# Patient Record
Sex: MALE | Race: WHITE
[De-identification: names, ages, dates, MRNs, and addresses within clinical notes are randomized per-mention and may not be internally consistent; named-entity substitution may affect disease eponyms.]

---

## 2019-06-03 ENCOUNTER — HOSPITAL ENCOUNTER (OUTPATIENT)
Dept: HOSPITAL 92 - SCSMRI | Age: 34
Discharge: HOME | End: 2019-06-03
Attending: FAMILY MEDICINE
Payer: COMMERCIAL

## 2019-06-03 DIAGNOSIS — M27.40: ICD-10-CM

## 2019-06-03 DIAGNOSIS — R51: Primary | ICD-10-CM

## 2019-06-03 DIAGNOSIS — Z82.49: ICD-10-CM

## 2019-06-03 PROCEDURE — A9577 INJ MULTIHANCE: HCPCS

## 2019-06-03 PROCEDURE — 70553 MRI BRAIN STEM W/O & W/DYE: CPT

## 2019-06-03 NOTE — MRI
BRAIN MRI WITH AND WITHOUT CONTRAST:

 

Date:  06/03/19 

 

HISTORY:  Family medical history of brain aneurysm. Frequent headaches. 

 

COMPARISON:  None. 

 

FINDINGS:

No hemorrhage on the axial gradient echo sequence. 

 

No parenchymal mass, mass effect, or midline shift. Brain volume is age-appropriate. Cortical gray-wh
ite matter differentiation is preserved. 

 

Ventricles and sulci are patent and symmetric. 

 

No significant T2 or FLAIR white matter hyperintensities. Central arterial flow-voids are maintained.
 Absent restricted diffusion. 

 

Calvarium has a normal marrow signal intensity. Midline brain parenchymal structures are unremarkable
. 

 

Mucus retention cyst in the left maxillary sinus. Partial opacification of the ethmoid air cells. Bianca
quate mastoid air cell aeration. 

 

No pathologic enhancement of the brain parenchyma. 

 

IMPRESSION: 

1.  Absent restricted diffusion. No acute infarct. 

2.  No pathologic enhancement of the brain parenchyma. 

3.  Mucus retention cyst in the left maxillary sinus. 

4.  Central arterial flow-voids are maintained. No definite evidence of an aneurysm at the level of t
he [place]. Current study is limited in the overall evaluation. MR angiogram or CT angiogram
 of [place] if clinically warranted. 

 

 

POS: [facility]

## 2023-07-02 ENCOUNTER — HOSPITAL ENCOUNTER (INPATIENT)
Dept: HOSPITAL 92 - CSHERS | Age: 38
LOS: 1 days | Discharge: HOME | DRG: 310 | End: 2023-07-03
Attending: PHYSICIAN ASSISTANT | Admitting: EMERGENCY MEDICINE
Payer: COMMERCIAL

## 2023-07-02 VITALS — BODY MASS INDEX: 29.2 KG/M2

## 2023-07-02 DIAGNOSIS — I48.0: Primary | ICD-10-CM

## 2023-07-02 DIAGNOSIS — I34.0: ICD-10-CM

## 2023-07-02 LAB
ALBUMIN SERPL BCG-MCNC: 5.2 G/DL (ref 3.5–5)
ALP SERPL-CCNC: 56 U/L (ref 40–110)
ALT SERPL W P-5'-P-CCNC: 34 U/L (ref 8–55)
ANION GAP SERPL CALC-SCNC: 16 MMOL/L (ref 10–20)
AST SERPL-CCNC: 26 U/L (ref 5–34)
BASOPHILS # BLD AUTO: 0.1 10X3/UL (ref 0–0.2)
BASOPHILS NFR BLD AUTO: 0.7 % (ref 0–2)
BILIRUB SERPL-MCNC: 0.4 MG/DL (ref 0.2–1.2)
BUN SERPL-MCNC: 11 MG/DL (ref 8.9–20.6)
CALCIUM SERPL-MCNC: 10.1 MG/DL (ref 7.8–10.44)
CHLORIDE SERPL-SCNC: 106 MMOL/L (ref 98–107)
CO2 SERPL-SCNC: 23 MMOL/L (ref 22–29)
CREAT CL PREDICTED SERPL C-G-VRATE: 0 ML/MIN (ref 70–130)
EOSINOPHIL # BLD AUTO: 0.5 10X3/UL (ref 0–0.5)
EOSINOPHIL NFR BLD AUTO: 6.4 % (ref 0–6)
GLOBULIN SER CALC-MCNC: 3.1 G/DL (ref 2.4–3.5)
GLUCOSE SERPL-MCNC: 108 MG/DL (ref 70–105)
HGB BLD-MCNC: 16.4 G/DL (ref 13.5–17.5)
LYMPHOCYTES NFR BLD AUTO: 23.8 % (ref 18–47)
MCH RBC QN AUTO: 31.5 PG (ref 27–33)
MCV RBC AUTO: 88.9 FL (ref 81.2–95.1)
MONOCYTES # BLD AUTO: 0.3 10X3/UL (ref 0–1.1)
MONOCYTES NFR BLD AUTO: 3.8 % (ref 0–10)
NEUTROPHILS # BLD AUTO: 4.7 10X3/UL (ref 1.5–8.4)
NEUTROPHILS NFR BLD AUTO: 65.2 % (ref 40–75)
PLATELET # BLD AUTO: 351 10X3/UL (ref 150–450)
POTASSIUM SERPL-SCNC: 3.8 MMOL/L (ref 3.5–5.1)
RBC # BLD AUTO: 5.21 10X6/UL (ref 4.32–5.72)
SODIUM SERPL-SCNC: 141 MMOL/L (ref 136–145)
TROPONIN I SERPL DL<=0.01 NG/ML-MCNC: (no result) NG/ML (ref ?–0.03)
TROPONIN I SERPL DL<=0.01 NG/ML-MCNC: 0.01 NG/ML (ref ?–0.03)
WBC # BLD AUTO: 7.2 10X3/UL (ref 3.5–10.5)

## 2023-07-02 PROCEDURE — 96374 THER/PROPH/DIAG INJ IV PUSH: CPT

## 2023-07-02 PROCEDURE — 80048 BASIC METABOLIC PNL TOTAL CA: CPT

## 2023-07-02 PROCEDURE — 93306 TTE W/DOPPLER COMPLETE: CPT

## 2023-07-02 PROCEDURE — 84443 ASSAY THYROID STIM HORMONE: CPT

## 2023-07-02 PROCEDURE — 96372 THER/PROPH/DIAG INJ SC/IM: CPT

## 2023-07-02 PROCEDURE — 71045 X-RAY EXAM CHEST 1 VIEW: CPT

## 2023-07-02 PROCEDURE — 83735 ASSAY OF MAGNESIUM: CPT

## 2023-07-02 PROCEDURE — 80053 COMPREHEN METABOLIC PANEL: CPT

## 2023-07-02 PROCEDURE — 85025 COMPLETE CBC W/AUTO DIFF WBC: CPT

## 2023-07-02 PROCEDURE — 84484 ASSAY OF TROPONIN QUANT: CPT

## 2023-07-02 PROCEDURE — 93005 ELECTROCARDIOGRAM TRACING: CPT

## 2023-07-02 PROCEDURE — 96361 HYDRATE IV INFUSION ADD-ON: CPT

## 2023-07-03 VITALS — SYSTOLIC BLOOD PRESSURE: 120 MMHG | DIASTOLIC BLOOD PRESSURE: 77 MMHG | TEMPERATURE: 97.8 F

## 2023-07-03 LAB
ANION GAP SERPL CALC-SCNC: 13 MMOL/L (ref 10–20)
BASOPHILS # BLD AUTO: 0 10X3/UL (ref 0–0.2)
BASOPHILS NFR BLD AUTO: 0.7 % (ref 0–2)
BUN SERPL-MCNC: 12 MG/DL (ref 8.9–20.6)
CALCIUM SERPL-MCNC: 9 MG/DL (ref 7.8–10.44)
CHLORIDE SERPL-SCNC: 111 MMOL/L (ref 98–107)
CO2 SERPL-SCNC: 23 MMOL/L (ref 22–29)
CREAT CL PREDICTED SERPL C-G-VRATE: 129 ML/MIN (ref 70–130)
EOSINOPHIL # BLD AUTO: 0.5 10X3/UL (ref 0–0.5)
EOSINOPHIL NFR BLD AUTO: 9.2 % (ref 0–6)
GLUCOSE SERPL-MCNC: 90 MG/DL (ref 70–105)
HGB BLD-MCNC: 14.4 G/DL (ref 13.5–17.5)
LYMPHOCYTES NFR BLD AUTO: 39.1 % (ref 18–47)
MAGNESIUM SERPL-MCNC: 2 MG/DL (ref 1.6–2.6)
MCH RBC QN AUTO: 30.8 PG (ref 27–33)
MCV RBC AUTO: 89.5 FL (ref 81.2–95.1)
MONOCYTES # BLD AUTO: 0.5 10X3/UL (ref 0–1.1)
MONOCYTES NFR BLD AUTO: 8.3 % (ref 0–10)
NEUTROPHILS # BLD AUTO: 2.4 10X3/UL (ref 1.5–8.4)
NEUTROPHILS NFR BLD AUTO: 42.7 % (ref 40–75)
PLATELET # BLD AUTO: 309 10X3/UL (ref 150–450)
POTASSIUM SERPL-SCNC: 4 MMOL/L (ref 3.5–5.1)
RBC # BLD AUTO: 4.67 10X6/UL (ref 4.32–5.72)
SODIUM SERPL-SCNC: 143 MMOL/L (ref 136–145)
WBC # BLD AUTO: 5.7 10X3/UL (ref 3.5–10.5)